# Patient Record
Sex: MALE | Race: WHITE | URBAN - METROPOLITAN AREA
[De-identification: names, ages, dates, MRNs, and addresses within clinical notes are randomized per-mention and may not be internally consistent; named-entity substitution may affect disease eponyms.]

---

## 2022-08-04 PROBLEM — Z12.11 COLON CANCER SCREENING (LOW/AVERAGE RISK): Status: ACTIVE | Noted: 2022-08-04

## 2022-08-15 ENCOUNTER — OFFICE (OUTPATIENT)
Dept: URBAN - METROPOLITAN AREA PATHOLOGY 1 | Facility: PATHOLOGY | Age: 57
End: 2022-08-15

## 2022-08-15 ENCOUNTER — AMBULATORY SURGICAL CENTER (OUTPATIENT)
Dept: URBAN - METROPOLITAN AREA SURGERY 7 | Facility: SURGERY | Age: 57
End: 2022-08-15
Payer: COMMERCIAL

## 2022-08-15 VITALS
RESPIRATION RATE: 25 BRPM | HEIGHT: 73 IN | SYSTOLIC BLOOD PRESSURE: 115 MMHG | HEART RATE: 80 BPM | RESPIRATION RATE: 22 BRPM | HEART RATE: 71 BPM | DIASTOLIC BLOOD PRESSURE: 87 MMHG | HEART RATE: 74 BPM | SYSTOLIC BLOOD PRESSURE: 103 MMHG | SYSTOLIC BLOOD PRESSURE: 120 MMHG | RESPIRATION RATE: 24 BRPM | DIASTOLIC BLOOD PRESSURE: 91 MMHG | OXYGEN SATURATION: 90 % | SYSTOLIC BLOOD PRESSURE: 120 MMHG | RESPIRATION RATE: 22 BRPM | HEART RATE: 67 BPM | HEART RATE: 70 BPM | DIASTOLIC BLOOD PRESSURE: 76 MMHG | OXYGEN SATURATION: 98 % | DIASTOLIC BLOOD PRESSURE: 87 MMHG | DIASTOLIC BLOOD PRESSURE: 71 MMHG | DIASTOLIC BLOOD PRESSURE: 85 MMHG | RESPIRATION RATE: 21 BRPM | HEART RATE: 79 BPM | RESPIRATION RATE: 23 BRPM | HEART RATE: 69 BPM | HEART RATE: 80 BPM | HEART RATE: 74 BPM | DIASTOLIC BLOOD PRESSURE: 70 MMHG | SYSTOLIC BLOOD PRESSURE: 127 MMHG | HEART RATE: 70 BPM | DIASTOLIC BLOOD PRESSURE: 72 MMHG | HEART RATE: 73 BPM | WEIGHT: 210 LBS | HEART RATE: 71 BPM | DIASTOLIC BLOOD PRESSURE: 76 MMHG | OXYGEN SATURATION: 97 % | SYSTOLIC BLOOD PRESSURE: 113 MMHG | DIASTOLIC BLOOD PRESSURE: 71 MMHG | RESPIRATION RATE: 21 BRPM | DIASTOLIC BLOOD PRESSURE: 66 MMHG | HEART RATE: 73 BPM | DIASTOLIC BLOOD PRESSURE: 91 MMHG | DIASTOLIC BLOOD PRESSURE: 85 MMHG | DIASTOLIC BLOOD PRESSURE: 66 MMHG | SYSTOLIC BLOOD PRESSURE: 115 MMHG | SYSTOLIC BLOOD PRESSURE: 113 MMHG | RESPIRATION RATE: 23 BRPM | SYSTOLIC BLOOD PRESSURE: 137 MMHG | OXYGEN SATURATION: 97 % | RESPIRATION RATE: 16 BRPM | HEART RATE: 69 BPM | SYSTOLIC BLOOD PRESSURE: 129 MMHG | SYSTOLIC BLOOD PRESSURE: 129 MMHG | SYSTOLIC BLOOD PRESSURE: 112 MMHG | DIASTOLIC BLOOD PRESSURE: 70 MMHG | HEIGHT: 73 IN | OXYGEN SATURATION: 96 % | SYSTOLIC BLOOD PRESSURE: 112 MMHG | HEART RATE: 79 BPM | RESPIRATION RATE: 24 BRPM | HEART RATE: 65 BPM | RESPIRATION RATE: 16 BRPM | HEART RATE: 67 BPM | RESPIRATION RATE: 25 BRPM | OXYGEN SATURATION: 98 % | SYSTOLIC BLOOD PRESSURE: 137 MMHG | SYSTOLIC BLOOD PRESSURE: 103 MMHG | OXYGEN SATURATION: 90 % | HEART RATE: 65 BPM | SYSTOLIC BLOOD PRESSURE: 127 MMHG | DIASTOLIC BLOOD PRESSURE: 72 MMHG | OXYGEN SATURATION: 96 % | WEIGHT: 210 LBS

## 2022-08-15 DIAGNOSIS — K64.1 SECOND DEGREE HEMORRHOIDS: ICD-10-CM

## 2022-08-15 DIAGNOSIS — Z12.11 ENCOUNTER FOR SCREENING FOR MALIGNANT NEOPLASM OF COLON: ICD-10-CM

## 2022-08-15 DIAGNOSIS — D12.5 BENIGN NEOPLASM OF SIGMOID COLON: ICD-10-CM

## 2022-08-15 DIAGNOSIS — D12.3 BENIGN NEOPLASM OF TRANSVERSE COLON: ICD-10-CM

## 2022-08-15 DIAGNOSIS — K62.1 RECTAL POLYP: ICD-10-CM

## 2022-08-15 DIAGNOSIS — K63.89 OTHER SPECIFIED DISEASES OF INTESTINE: ICD-10-CM

## 2022-08-15 PROCEDURE — 45385 COLONOSCOPY W/LESION REMOVAL: CPT | Mod: 33 | Performed by: INTERNAL MEDICINE

## 2022-08-15 PROCEDURE — 88305 TISSUE EXAM BY PATHOLOGIST: CPT | Performed by: PATHOLOGY

## 2022-08-22 LAB
PDF: PDF REPORT: (no result)
PDF: PDF REPORT: (no result)

## 2022-09-26 ENCOUNTER — OFFICE (OUTPATIENT)
Dept: URBAN - METROPOLITAN AREA CLINIC 17 | Facility: CLINIC | Age: 57
End: 2022-09-26

## 2022-09-26 VITALS
SYSTOLIC BLOOD PRESSURE: 130 MMHG | DIASTOLIC BLOOD PRESSURE: 68 MMHG | HEART RATE: 73 BPM | OXYGEN SATURATION: 96 % | WEIGHT: 213 LBS | HEIGHT: 73 IN

## 2022-09-26 DIAGNOSIS — K59.00 CONSTIPATION, UNSPECIFIED: ICD-10-CM

## 2022-09-26 DIAGNOSIS — K63.5 POLYP OF COLON: ICD-10-CM

## 2022-09-26 DIAGNOSIS — K62.5 HEMORRHAGE OF ANUS AND RECTUM: ICD-10-CM

## 2022-09-26 PROCEDURE — 99213 OFFICE O/P EST LOW 20 MIN: CPT | Performed by: PHYSICIAN ASSISTANT

## 2024-03-12 ENCOUNTER — APPOINTMENT (OUTPATIENT)
Age: 59
End: 2024-03-12
Payer: COMMERCIAL

## 2024-03-12 ENCOUNTER — HOSPITAL ENCOUNTER (OUTPATIENT)
Age: 59
Setting detail: OBSERVATION
Discharge: HOME | End: 2024-03-13
Attending: EMERGENCY MEDICINE | Admitting: HOSPITALIST
Payer: COMMERCIAL

## 2024-03-12 DIAGNOSIS — Z82.49 FAMILY HISTORY OF CORONARY ARTERY DISEASE: ICD-10-CM

## 2024-03-12 DIAGNOSIS — I20.9 ANGINA PECTORIS (HCC): Primary | ICD-10-CM

## 2024-03-12 DIAGNOSIS — F17.210 CIGARETTE SMOKER: ICD-10-CM

## 2024-03-12 PROBLEM — R07.9 CHEST PAIN: Status: ACTIVE | Noted: 2024-03-12

## 2024-03-12 LAB
ALBUMIN SERPL-MCNC: 4.4 G/DL (ref 3.4–5)
ALBUMIN/GLOB SERPL: 1.9 {RATIO}
ALP SERPL-CCNC: 67 U/L (ref 40–129)
ALT SERPL-CCNC: 20 U/L (ref 10–40)
ANION GAP SERPL CALCULATED.3IONS-SCNC: 11 MMOL/L (ref 3–16)
AST SERPL-CCNC: 21 U/L (ref 15–37)
BASOPHILS # BLD: 0.06 K/UL (ref 0–0.2)
BASOPHILS NFR BLD: 1 %
BILIRUB SERPL-MCNC: 0.5 MG/DL (ref 0–1)
BNP SERPL-MCNC: 42 PG/ML (ref 0–124)
BUN SERPL-MCNC: 16 MG/DL (ref 7–20)
CALCIUM SERPL-MCNC: 9.3 MG/DL (ref 8.3–10.6)
CHLORIDE SERPL-SCNC: 105 MMOL/L (ref 99–110)
CO2 SERPL-SCNC: 23 MMOL/L (ref 21–32)
CREAT SERPL-MCNC: 0.9 MG/DL (ref 0.7–1.8)
D DIMER PPP FEU-MCNC: <0.27 UG/ML FEU (ref 0–0.6)
EKG ATRIAL RATE: 66 BPM
EKG DIAGNOSIS: NORMAL
EKG P AXIS: 40 DEGREES
EKG P-R INTERVAL: 174 MS
EKG Q-T INTERVAL: 404 MS
EKG QRS DURATION: 94 MS
EKG QTC CALCULATION (BAZETT): 423 MS
EKG R AXIS: 46 DEGREES
EKG T AXIS: 7 DEGREES
EKG VENTRICULAR RATE: 66 BPM
EOSINOPHIL # BLD: 0.06 K/UL (ref 0–0.6)
EOSINOPHILS RELATIVE PERCENT: 1 %
ERYTHROCYTE [DISTWIDTH] IN BLOOD BY AUTOMATED COUNT: 12.6 % (ref 12.4–15.4)
GFR SERPL CREATININE-BSD FRML MDRD: >60 ML/MIN/1.73M2
GLUCOSE SERPL-MCNC: 97 MG/DL (ref 70–99)
HCT VFR BLD AUTO: 44.4 % (ref 40.5–52.5)
HGB BLD-MCNC: 14.8 G/DL (ref 13.5–17.5)
IMM GRANULOCYTES # BLD AUTO: 0.01 K/UL (ref 0–0.5)
IMM GRANULOCYTES NFR BLD: 0 %
INR PPP: 1 (ref 0.8–1.2)
LIPASE SERPL-CCNC: 53 U/L (ref 13–60)
LYMPHOCYTES NFR BLD: 2.85 K/UL (ref 1–5.1)
LYMPHOCYTES RELATIVE PERCENT: 40 %
MCH RBC QN AUTO: 30.8 PG (ref 26–34)
MCHC RBC AUTO-ENTMCNC: 33.3 G/DL (ref 31–36)
MCV RBC AUTO: 92.5 FL (ref 80–100)
MONOCYTES NFR BLD: 0.49 K/UL (ref 0–1.3)
MONOCYTES NFR BLD: 7 %
NEUTROPHILS NFR BLD: 52 %
NEUTS SEG NFR BLD: 3.71 K/UL (ref 1.7–7.7)
PLATELET # BLD AUTO: 282 K/UL (ref 135–450)
PMV BLD AUTO: 10.5 FL (ref 9.4–12.4)
POTASSIUM SERPL-SCNC: 4.2 MMOL/L (ref 3.5–5.1)
PROT SERPL-MCNC: 6.7 G/DL (ref 6.4–8.2)
PROTHROMBIN TIME: 12.4 SEC (ref 11.5–14.8)
RBC # BLD AUTO: 4.8 M/UL (ref 4.2–5.9)
SODIUM SERPL-SCNC: 139 MMOL/L (ref 136–145)
TROPONIN I SERPL HS-MCNC: 11 NG/L (ref 0–22)
TROPONIN I SERPL HS-MCNC: 7 NG/L (ref 0–22)
TROPONIN I SERPL HS-MCNC: <6 NG/L (ref 0–22)
TROPONIN I SERPL HS-MCNC: <6 NG/L (ref 0–22)
WBC OTHER # BLD: 7.2 K/UL (ref 4–11)

## 2024-03-12 PROCEDURE — 71045 X-RAY EXAM CHEST 1 VIEW: CPT

## 2024-03-12 PROCEDURE — G0378 HOSPITAL OBSERVATION PER HR: HCPCS

## 2024-03-12 PROCEDURE — 85025 COMPLETE CBC W/AUTO DIFF WBC: CPT

## 2024-03-12 PROCEDURE — 83880 ASSAY OF NATRIURETIC PEPTIDE: CPT

## 2024-03-12 PROCEDURE — 2580000003 HC RX 258: Performed by: HOSPITALIST

## 2024-03-12 PROCEDURE — 6370000000 HC RX 637 (ALT 250 FOR IP): Performed by: EMERGENCY MEDICINE

## 2024-03-12 PROCEDURE — 93005 ELECTROCARDIOGRAM TRACING: CPT | Performed by: EMERGENCY MEDICINE

## 2024-03-12 PROCEDURE — 85379 FIBRIN DEGRADATION QUANT: CPT

## 2024-03-12 PROCEDURE — 85610 PROTHROMBIN TIME: CPT

## 2024-03-12 PROCEDURE — 6360000002 HC RX W HCPCS: Performed by: HOSPITALIST

## 2024-03-12 PROCEDURE — 36415 COLL VENOUS BLD VENIPUNCTURE: CPT

## 2024-03-12 PROCEDURE — 6370000000 HC RX 637 (ALT 250 FOR IP): Performed by: INTERNAL MEDICINE

## 2024-03-12 PROCEDURE — 6370000000 HC RX 637 (ALT 250 FOR IP): Performed by: HOSPITALIST

## 2024-03-12 PROCEDURE — 84484 ASSAY OF TROPONIN QUANT: CPT

## 2024-03-12 PROCEDURE — 99222 1ST HOSP IP/OBS MODERATE 55: CPT | Performed by: INTERNAL MEDICINE

## 2024-03-12 PROCEDURE — 93005 ELECTROCARDIOGRAM TRACING: CPT

## 2024-03-12 PROCEDURE — 99285 EMERGENCY DEPT VISIT HI MDM: CPT

## 2024-03-12 PROCEDURE — 83690 ASSAY OF LIPASE: CPT

## 2024-03-12 PROCEDURE — C8929 TTE W OR WO FOL WCON,DOPPLER: HCPCS

## 2024-03-12 PROCEDURE — 80053 COMPREHEN METABOLIC PANEL: CPT

## 2024-03-12 PROCEDURE — 96372 THER/PROPH/DIAG INJ SC/IM: CPT

## 2024-03-12 RX ORDER — METOPROLOL TARTRATE 50 MG/1
50 TABLET, FILM COATED ORAL 2 TIMES DAILY
Status: DISCONTINUED | OUTPATIENT
Start: 2024-03-12 | End: 2024-03-13 | Stop reason: HOSPADM

## 2024-03-12 RX ORDER — POTASSIUM CHLORIDE 20 MEQ/1
40 TABLET, EXTENDED RELEASE ORAL PRN
Status: DISCONTINUED | OUTPATIENT
Start: 2024-03-12 | End: 2024-03-13 | Stop reason: HOSPADM

## 2024-03-12 RX ORDER — ASPIRIN 81 MG/1
324 TABLET, CHEWABLE ORAL ONCE
Status: COMPLETED | OUTPATIENT
Start: 2024-03-12 | End: 2024-03-12

## 2024-03-12 RX ORDER — POLYETHYLENE GLYCOL 3350 17 G/17G
17 POWDER, FOR SOLUTION ORAL DAILY PRN
Status: DISCONTINUED | OUTPATIENT
Start: 2024-03-12 | End: 2024-03-13 | Stop reason: HOSPADM

## 2024-03-12 RX ORDER — ACETAMINOPHEN 650 MG/1
650 SUPPOSITORY RECTAL EVERY 6 HOURS PRN
Status: DISCONTINUED | OUTPATIENT
Start: 2024-03-12 | End: 2024-03-13 | Stop reason: HOSPADM

## 2024-03-12 RX ORDER — SODIUM CHLORIDE 0.9 % (FLUSH) 0.9 %
5-40 SYRINGE (ML) INJECTION EVERY 12 HOURS SCHEDULED
Status: DISCONTINUED | OUTPATIENT
Start: 2024-03-12 | End: 2024-03-13 | Stop reason: HOSPADM

## 2024-03-12 RX ORDER — SODIUM CHLORIDE 0.9 % (FLUSH) 0.9 %
5-40 SYRINGE (ML) INJECTION PRN
Status: DISCONTINUED | OUTPATIENT
Start: 2024-03-12 | End: 2024-03-13 | Stop reason: HOSPADM

## 2024-03-12 RX ORDER — ONDANSETRON 2 MG/ML
4 INJECTION INTRAMUSCULAR; INTRAVENOUS EVERY 6 HOURS PRN
Status: DISCONTINUED | OUTPATIENT
Start: 2024-03-12 | End: 2024-03-13 | Stop reason: HOSPADM

## 2024-03-12 RX ORDER — ONDANSETRON 2 MG/ML
4 INJECTION INTRAMUSCULAR; INTRAVENOUS
Status: DISCONTINUED | OUTPATIENT
Start: 2024-03-12 | End: 2024-03-13 | Stop reason: SDUPTHER

## 2024-03-12 RX ORDER — ACETAMINOPHEN 325 MG/1
650 TABLET ORAL EVERY 6 HOURS PRN
Status: DISCONTINUED | OUTPATIENT
Start: 2024-03-12 | End: 2024-03-13 | Stop reason: HOSPADM

## 2024-03-12 RX ORDER — ATORVASTATIN CALCIUM 40 MG/1
40 TABLET, FILM COATED ORAL NIGHTLY
Status: DISCONTINUED | OUTPATIENT
Start: 2024-03-12 | End: 2024-03-13 | Stop reason: HOSPADM

## 2024-03-12 RX ORDER — SODIUM CHLORIDE 9 MG/ML
INJECTION, SOLUTION INTRAVENOUS PRN
Status: DISCONTINUED | OUTPATIENT
Start: 2024-03-12 | End: 2024-03-13 | Stop reason: HOSPADM

## 2024-03-12 RX ORDER — NITROGLYCERIN 0.4 MG/1
0.4 TABLET SUBLINGUAL EVERY 5 MIN PRN
Status: DISCONTINUED | OUTPATIENT
Start: 2024-03-12 | End: 2024-03-13 | Stop reason: HOSPADM

## 2024-03-12 RX ORDER — METOPROLOL TARTRATE 1 MG/ML
5 INJECTION, SOLUTION INTRAVENOUS EVERY 5 MIN PRN
Status: DISCONTINUED | OUTPATIENT
Start: 2024-03-12 | End: 2024-03-13 | Stop reason: HOSPADM

## 2024-03-12 RX ORDER — ONDANSETRON 4 MG/1
4 TABLET, ORALLY DISINTEGRATING ORAL EVERY 8 HOURS PRN
Status: DISCONTINUED | OUTPATIENT
Start: 2024-03-12 | End: 2024-03-13 | Stop reason: HOSPADM

## 2024-03-12 RX ORDER — POTASSIUM CHLORIDE 7.45 MG/ML
10 INJECTION INTRAVENOUS PRN
Status: DISCONTINUED | OUTPATIENT
Start: 2024-03-12 | End: 2024-03-13 | Stop reason: HOSPADM

## 2024-03-12 RX ORDER — MAGNESIUM SULFATE IN WATER 40 MG/ML
2000 INJECTION, SOLUTION INTRAVENOUS PRN
Status: DISCONTINUED | OUTPATIENT
Start: 2024-03-12 | End: 2024-03-13 | Stop reason: HOSPADM

## 2024-03-12 RX ORDER — NITROGLYCERIN 0.4 MG/1
0.4 TABLET SUBLINGUAL EVERY 5 MIN PRN
Status: DISCONTINUED | OUTPATIENT
Start: 2024-03-12 | End: 2024-03-12 | Stop reason: SDUPTHER

## 2024-03-12 RX ORDER — ASPIRIN 81 MG/1
81 TABLET, CHEWABLE ORAL DAILY
Status: DISCONTINUED | OUTPATIENT
Start: 2024-03-13 | End: 2024-03-13 | Stop reason: HOSPADM

## 2024-03-12 RX ORDER — ENOXAPARIN SODIUM 100 MG/ML
40 INJECTION SUBCUTANEOUS DAILY
Status: DISCONTINUED | OUTPATIENT
Start: 2024-03-12 | End: 2024-03-13 | Stop reason: HOSPADM

## 2024-03-12 RX ADMIN — ATORVASTATIN CALCIUM 40 MG: 40 TABLET, FILM COATED ORAL at 21:07

## 2024-03-12 RX ADMIN — ENOXAPARIN SODIUM 40 MG: 100 INJECTION SUBCUTANEOUS at 15:08

## 2024-03-12 RX ADMIN — NITROGLYCERIN 1 INCH: 20 OINTMENT TOPICAL at 12:39

## 2024-03-12 RX ADMIN — ASPIRIN 81 MG 324 MG: 81 TABLET ORAL at 12:36

## 2024-03-12 RX ADMIN — METOPROLOL TARTRATE 50 MG: 50 TABLET, FILM COATED ORAL at 21:07

## 2024-03-12 RX ADMIN — Medication 10 ML: at 21:13

## 2024-03-12 ASSESSMENT — PAIN - FUNCTIONAL ASSESSMENT: PAIN_FUNCTIONAL_ASSESSMENT: NONE - DENIES PAIN

## 2024-03-12 ASSESSMENT — LIFESTYLE VARIABLES
HOW MANY STANDARD DRINKS CONTAINING ALCOHOL DO YOU HAVE ON A TYPICAL DAY: 5 OR 6
HOW OFTEN DO YOU HAVE A DRINK CONTAINING ALCOHOL: 4 OR MORE TIMES A WEEK

## 2024-03-12 ASSESSMENT — HEART SCORE: ECG: 0

## 2024-03-12 NOTE — H&P
History and Physical    Name:  Ashwin Newsome /Age/Sex: 1965  (58 y.o. male)   MRN & CSN:  [id number] & [id number] Encounter Date/Time: 3/12/51835:30 PM   Location:  [facility]7/3207-01 PCP: Phoebe Love     Admitting Physician:  Mendoza Ventura MD     Assessment:   Ashwin Newsome is a 58 y.o. male with medical history including COPD presented with episodes of chest discomfort, sweating and nausea x 2 in last 3 days.  In ER found to have abnormal EKG with nonspecific ST-T wave changes.  Chest pressure/atypical chest pain r/o ACS  Abnormal EKG: St-T wave change in inferior leads  COPD, no acute exacerbation: On albuterol inhaler as needed  Tobacco smoking: Smokes about 2 packs/day    Plan:   Admission status - observation, Expect length of stay < 2 midnights  Troponins x 3, tele monitoring  Discussed with cardiology team, echocardiogram and CTA for cardiac evaluation.  Advised to quit smoking  Albuterol inhaler as needed, respiratory therapy  Resume home meds as ordered   See orders  Personally reviewed Lab Studies and Imaging results ordered by ER.  Discussed management of the case with ER team who recommended admission under Hospitalist service  EKG 3/12/2024 interpreted personally and results - NSR, nonspecific ST-T wave changes in inferior leads  Drugs that require monitoring for toxicity include   Diet ADULT DIET; Regular; No Added Salt (3-4 gm); No Caffeine   DVT Prophylaxis [] Lovenox, []  Heparin, [] SCDs, [] Ambulation,  [] Eliquis, [] Xarelto, [] Coumadin   Code Status Full Code     Disposition Plan:   Current Living situation: Home  Expected Disposition:  2-3 days  Estimated D/C:  vs [facility]     History from:   Patient and Family    History of Present Illness:   Chief Complaint/Reason for admission: Sweating episodes with chest discomfort    HPI:  Ashwin Newsome is a 58 y.o. male with medical history including COPD presented to emergency room with complaint of 2 separate episodes of chest

## 2024-03-12 NOTE — CONSULTS
estimated     ejection fraction was in the range of 55% to 60%. Wall     motion was normal; there were no regional wall motion     abnormalities.     Assessment/Plan:  Principal Problem:    Chest pain  Troponin neg X3       Plan:   Echo.  Cardiac CTA - metoprolol 50 mg tonight and tomorrow am to get HR <60 bpm, hold parameters placed.       Thank you for allowing us to participate in the care of Ashwin Newsome. Further evaluation will be based upon the patient's clinical course and testing results.    All questions and concerns were addressed to the patient/family. Alternatives to my treatment were discussed. The note was completed using EMR. Every effort was made to ensure accuracy; however, inadvertent computerized transcription errors may be present.    Manan Garnett M.D.

## 2024-03-12 NOTE — PLAN OF CARE
Problem: Discharge Planning  Goal: Discharge to home or other facility with appropriate resources  3/12/2024 1743 by Isabelle Bee RN  Outcome: Progressing  3/12/2024 1743 by Isabelle Bee RN  Outcome: Progressing     Problem: Safety - Adult  Goal: Free from fall injury  3/12/2024 1743 by Isabelle Bee RN  Outcome: Progressing  3/12/2024 1743 by Isabelle Bee RN  Outcome: Progressing     Problem: Chronic Conditions and Co-morbidities  Goal: Patient's chronic conditions and co-morbidity symptoms are monitored and maintained or improved  3/12/2024 1743 by Isabelle Bee RN  Outcome: Progressing  3/12/2024 1743 by Isabelle Bee RN  Outcome: Progressing

## 2024-03-12 NOTE — ED NOTES
Patient oriented to room and ED throughput process.  Safety measures with ED bed locked in lowest position and call light in reach.  Patient educated on all orders, including any medications.  Patient educated on chief complaint/symptoms. Patient encouraged to ask questions regarding care, medications or treatment plan.  Patient aware of how to reach staff with questions/concerns.  
Patient placed on cardiac monitor upon arrival  
evan Bose at bedside obtaining EKG   
(ZOFRAN) injection 4 mg (has no administration in time range)   aspirin chewable tablet 324 mg (324 mg Oral Given 3/12/24 1236)   nitroglycerin (NITRO-BID) 2 % ointment 1 inch (1 inch Topical Given 3/12/24 1239)     Last documented pain medication administration: na  Pertinent or High Risk Medications/Drips: no   If Yes, please provide details:   Blood Product Administration: no  If Yes, please provide details:   Process Protocols/Bundles: N/A    Recommendation  Incomplete STAT orders: N/A  Overdue Medications: N/A  Patient Belongings: given to wife  Additional Comments:   If any further questions, please call Sending RN at [phone]      Admitting Unit Notification  Name of person notified and time: Leticia GAUTAM      Electronically signed by: Electronically signed by Cristiane Quintero RN on 3/12/2024 at 1:21 PM

## 2024-03-12 NOTE — ED PROVIDER NOTES
[facility] - [facility], Kota Rick MD, am the primary clinician of record.    CHIEF COMPLAINT  Chief Complaint   Patient presents with    Dizziness     Pt reports dizziness that began Friday night. Pt report his hear felt \"like it was beating fast\", he was shaky, and sweaty, pt reports he is currently having \"a little bit of dizziness\" and \"sick to his stomach\".       HISTORY OF PRESENT ILLNESS  Ashwin Newsome is a 58 y.o. male  who presents to the ED complaining of onset initially on Friday evening of unprovoked palpitations, shakiness and pallor with diaphoresis, some nausea and some chest pressure transient sensation.  He felt lightheaded but did not pass out.  No focal numbness weakness or tingling.  He has not had a fever.  He does have a history of COPD and is a smoker with a strong family history of CAD.  However he has no personal cardiac history.  He says that he only was able to complete part of a stress test but never had a heart cath.  He has no history of hypertension hyperlipidemia or diabetes that he knows about.  His symptoms were \"okay\" over the weekend however worsened today to the point where his significant other was concerned and brought him to the hospital.    No other complaints, modifying factors or associated symptoms.     I have reviewed the following from the nursing documentation.    Past Medical History:   Diagnosis Date    COPD (chronic obstructive pulmonary disease) (HCC)      History reviewed. No pertinent surgical history.  History reviewed. No pertinent family history.  Social History     Socioeconomic History    Marital status:      Spouse name: Not on file    Number of children: Not on file    Years of education: Not on file    Highest education level: Not on file   Occupational History    Not on file   Tobacco Use    Smoking status: Every Day     Current packs/day: 2.00     Types: Cigarettes    Smokeless tobacco: Never   Substance

## 2024-03-13 ENCOUNTER — APPOINTMENT (OUTPATIENT)
Age: 59
End: 2024-03-13
Payer: COMMERCIAL

## 2024-03-13 VITALS
TEMPERATURE: 98 F | OXYGEN SATURATION: 92 % | BODY MASS INDEX: 28.15 KG/M2 | SYSTOLIC BLOOD PRESSURE: 109 MMHG | DIASTOLIC BLOOD PRESSURE: 93 MMHG | HEIGHT: 73 IN | WEIGHT: 212.4 LBS | RESPIRATION RATE: 17 BRPM | HEART RATE: 61 BPM

## 2024-03-13 LAB
CHOLEST SERPL-MCNC: 181 MG/DL (ref 0–199)
EKG ATRIAL RATE: 58 BPM
EKG DIAGNOSIS: NORMAL
EKG P AXIS: 69 DEGREES
EKG P-R INTERVAL: 182 MS
EKG Q-T INTERVAL: 426 MS
EKG QRS DURATION: 94 MS
EKG QTC CALCULATION (BAZETT): 418 MS
EKG R AXIS: 67 DEGREES
EKG T AXIS: 62 DEGREES
EKG VENTRICULAR RATE: 58 BPM
ERYTHROCYTE [DISTWIDTH] IN BLOOD BY AUTOMATED COUNT: 12.7 % (ref 12.4–15.4)
HCT VFR BLD AUTO: 41.9 % (ref 40.5–52.5)
HDLC SERPL-MCNC: 44 MG/DL (ref 40–60)
HGB BLD-MCNC: 14 G/DL (ref 13.5–17.5)
LDLC SERPL CALC-MCNC: 115 MG/DL (ref 0–99)
MCH RBC QN AUTO: 31.1 PG (ref 26–34)
MCHC RBC AUTO-ENTMCNC: 33.4 G/DL (ref 31–36)
MCV RBC AUTO: 93.1 FL (ref 80–100)
PLATELET # BLD AUTO: 249 K/UL (ref 135–450)
PMV BLD AUTO: 10.6 FL (ref 9.4–12.4)
RBC # BLD AUTO: 4.5 M/UL (ref 4.2–5.9)
TRIGL SERPL-MCNC: 110 MG/DL (ref 0–149)
VLDLC SERPL CALC-MCNC: 22 MG/DL
WBC OTHER # BLD: 8.3 K/UL (ref 4–11)

## 2024-03-13 PROCEDURE — 80061 LIPID PANEL: CPT

## 2024-03-13 PROCEDURE — 6360000004 HC RX CONTRAST MEDICATION: Performed by: INTERNAL MEDICINE

## 2024-03-13 PROCEDURE — 6370000000 HC RX 637 (ALT 250 FOR IP): Performed by: RADIOLOGY

## 2024-03-13 PROCEDURE — 2580000003 HC RX 258: Performed by: HOSPITALIST

## 2024-03-13 PROCEDURE — G0378 HOSPITAL OBSERVATION PER HR: HCPCS

## 2024-03-13 PROCEDURE — 6370000000 HC RX 637 (ALT 250 FOR IP): Performed by: HOSPITALIST

## 2024-03-13 PROCEDURE — 75574 CT ANGIO HRT W/3D IMAGE: CPT

## 2024-03-13 PROCEDURE — 99232 SBSQ HOSP IP/OBS MODERATE 35: CPT | Performed by: INTERNAL MEDICINE

## 2024-03-13 PROCEDURE — 85027 COMPLETE CBC AUTOMATED: CPT

## 2024-03-13 PROCEDURE — 36415 COLL VENOUS BLD VENIPUNCTURE: CPT

## 2024-03-13 RX ORDER — ALBUTEROL SULFATE 90 UG/1
2 AEROSOL, METERED RESPIRATORY (INHALATION) 4 TIMES DAILY PRN
Qty: 18 G | Refills: 0
Start: 2024-03-13

## 2024-03-13 RX ORDER — NITROGLYCERIN 0.4 MG/1
0.4 TABLET SUBLINGUAL EVERY 5 MIN PRN
Status: COMPLETED | OUTPATIENT
Start: 2024-03-13 | End: 2024-03-13

## 2024-03-13 RX ORDER — AZITHROMYCIN 500 MG/1
500 TABLET, FILM COATED ORAL DAILY
Qty: 5 TABLET | Refills: 0 | Status: SHIPPED | OUTPATIENT
Start: 2024-03-13 | End: 2024-03-18

## 2024-03-13 RX ADMIN — NITROGLYCERIN 0.4 MG: 0.4 TABLET SUBLINGUAL at 09:54

## 2024-03-13 RX ADMIN — ASPIRIN 81 MG 81 MG: 81 TABLET ORAL at 10:09

## 2024-03-13 RX ADMIN — IOPAMIDOL 100 ML: 755 INJECTION, SOLUTION INTRAVENOUS at 09:39

## 2024-03-13 RX ADMIN — Medication 10 ML: at 10:11

## 2024-03-13 ASSESSMENT — PAIN DESCRIPTION - LOCATION: LOCATION: CHEST

## 2024-03-13 ASSESSMENT — PAIN - FUNCTIONAL ASSESSMENT: PAIN_FUNCTIONAL_ASSESSMENT: ACTIVITIES ARE NOT PREVENTED

## 2024-03-13 ASSESSMENT — PAIN DESCRIPTION - ONSET: ONSET: SUDDEN

## 2024-03-13 ASSESSMENT — PAIN DESCRIPTION - FREQUENCY: FREQUENCY: INTERMITTENT

## 2024-03-13 ASSESSMENT — PAIN DESCRIPTION - ORIENTATION: ORIENTATION: MID

## 2024-03-13 ASSESSMENT — PAIN DESCRIPTION - DESCRIPTORS: DESCRIPTORS: ACHING

## 2024-03-13 ASSESSMENT — PAIN SCALES - GENERAL: PAINLEVEL_OUTOF10: 6

## 2024-03-13 NOTE — CARE COORDINATION
Discharge planning:    Plan is home in care of family. Wife can transport pt home at discharge. No d/c needs at this time. Cm to continue to follow for additional needs.       KANIKA RodriguezN, RN  RN Case Manager   
discharge:      Financial    Payor: BCBS / Plan: BCBS - [place] PPO / Product Type: *No Product type* /     Does insurance require precert for SNF: Yes    Potential assistance Purchasing Medications: No  Meds-to-Beds request:        [id number] - [place], [place] - [address] - P [phone] - F [phone]  [address] [facility] [address]  [facility] [address]  Phone: [phone] Fax: [phone]      Notes:    Factors facilitating achievement of predicted outcomes: Family support, Motivated, Cooperative, and Pleasant    Barriers to discharge: Decreased endurance    Additional Case Management Notes: Reviewed chart. Met with pt and wife at bedside. Explained cm role.  Pt is from home. Plan is home in care of family. Wife can transport pt home at discharge. No d/c needs at this time. Cm to continue to follow.    The Plan for Transition of Care is related to the following treatment goals of Angina pectoris (HCC) [I20.9]  Chest pain [R07.9]    IF APPLICABLE: The Patient and/or patient representative Ashwin and his family were provided with a choice of provider and agrees with the discharge plan. Freedom of choice list with basic dialogue that supports the patient's individualized plan of care/goals and shares the quality data associated with the providers was provided to:     Patient Representative Name:       The Patient and/or Patient Representative Agree with the Discharge Plan?      SHAYNE Grewal  Case Management Department  Ph: [phone] Fax: [phone]

## 2024-03-13 NOTE — DISCHARGE INSTRUCTIONS
If you have any problems after going home please call your family doctor or go to the nearest emergency room.  Call PCP or go to nearest ER if you develop any of following symptoms or any worsening symptoms including Fever, chills, chest pain, cough, blood in sputum, shortness of breath, wheezing, nausea, vomiting, abdominal pain, diarrhea, Headach, Dizziness or any tingling or numbness in extremities.   Recommended medication compliance and regular follow up as outpt.

## 2024-03-13 NOTE — PLAN OF CARE
Problem: Discharge Planning  Goal: Discharge to home or other facility with appropriate resources  3/13/2024 1251 by Isabelle Bee RN  Outcome: Progressing  3/13/2024 0356 by Monalisa Fuller RN  Outcome: Progressing     Problem: Safety - Adult  Goal: Free from fall injury  3/13/2024 1251 by Isabelle Bee RN  Outcome: Progressing  3/13/2024 0356 by Monalisa Fuller RN  Outcome: Progressing     Problem: Chronic Conditions and Co-morbidities  Goal: Patient's chronic conditions and co-morbidity symptoms are monitored and maintained or improved  Outcome: Progressing

## 2024-03-13 NOTE — PROGRESS NOTES
Pt was educated that bed/chair alarm is to alert staff to ensure safety of the patient. Pt refuses bed/chair alarm and was informed of risk for fall, injury, and possible death. Pt verbalized understanding and continues to refuse bed/chair alarm.     Pt also informed NPO after midnight.   
Discharge instructions reviewed with patient and spouse. Verbalized understanding. IV dc'd without complications. Tele box returned to nurse's station. Patient to be taken to front entrance independently, pt. Refused wheelchair after fall risk education.    
Patient admitted to room 3207  from ED. Patient oriented to room, call light, bed rails, phone, lights and bathroom. Patient instructed about the schedule of the day including: vital sign frequency, lab draws, possible tests, frequency of MD and staff rounds, daily weights, I &O's and prescribed diet. Telemetry box in place, patient aware of placement and reason. Bed locked, in lowest position, side rails up 2/4, call light within reach.        Recliner Assessment  Patient is able to demonstrate the ability to move from a reclining position to an upright position within the recliner.       4 Eyes Skin Assessment     NAME:  Ashwin Newsome  YOB: 1965  MEDICAL RECORD NUMBER:  [id number]    The patient is being assessed for  Admission    I agree that at least one RN has performed a thorough Head to Toe Skin Assessment on the patient. ALL assessment sites listed below have been assessed.      Areas assessed by both nurses:    Head, Face, Ears, Shoulders, Back, Chest, Arms, Elbows, Hands, Sacrum. Buttock, Coccyx, Ischium, Legs. Feet and Heels, and Under Medical Devices         Does the Patient have a Wound? No noted wound(s)       Noel Prevention initiated by RN: No  Wound Care Orders initiated by RN: No    Pressure Injury (Stage 3,4, Unstageable, DTI, NWPT, and Complex wounds) if present, place Wound referral order by RN under : No    New Ostomies, if present place, Ostomy referral order under : No     Nurse 1 eSignature: Electronically signed by INOCENCIA BOLAÑOS RN on 3/12/24 at 1:59 PM EDT    **SHARE this note so that the co-signing nurse can place an eSignature**    Nurse 2 eSignature: Electronically signed by Isabelle Bee RN on 3/12/24 at 6:12 PM EDT   
Patient vitals prior to Cardiac /75 HR 52 patient given 0.4mg of SL nitro @ 0954. Patient tolerated well. CT scan completed per technologist   
Pt complain of chest pain to  who called RN. MD notified that vitals stable but heart rate was 56. MD aware of metoprolol was given for cta today. MD notified and said to continue to monitor. Since, pt further denies pain throughout rest of shift.   
Pt was educated that bed/chair alarm is to alert staff to ensure safety of the patient. Pt refuses bed/chair alarm and was informed of risk for fall, injury, and possible death. Pt verbalized understanding continues to refuse bed/chair alarm.     
discussed. The note was completed using EMR. Every effort was made to ensure accuracy; however, inadvertent computerized transcription errors may be present.  Breezy Sotelo MD, MD 3/13/2024 10:09 AM

## 2024-03-13 NOTE — DISCHARGE SUMMARY
Discharge Summary    Name:  Ashwin Newsome /Age/Sex: 1965 (58 y.o. male)   Admit Date: 3/12/2024  Discharge Date: 3/13/24    MRN & CSN:  [id number] & [id number] Encounter Date and Time 3/13/24    Attending:  Mendoza Ventura MD Discharging Provider: Mendoza Ventura MD     Hospital Course:   REASON FOR ADMISSION/CHIEF COMPLAINT:   Chest pain    PRINCIPAL DIAGNOSIS* AND HOSPITAL PROBLEM LIST:  Angina pectoris (HCC) [I20.9]  Chest pain [R07.9]    CONSULTS DURING THE ADMISSION:  IP CONSULT TO CARDIOLOGY    BRIEF HPI & SUMMARY OF HOSPITAL COURSE:   Ashwin Newsome is a 58 y.o. male with medical history including COPD presented with episodes of chest discomfort, sweating and nausea x 2 in last 3 days.  In ER found to have abnormal EKG with nonspecific ST-T wave changes.  Chest pressure/atypical chest pain, unclear etiology possible due to #3: Acute coronary syndrome was ruled out.  TTE normal.  CCTA with calcium score of 0.  No concern of coronary artery disease per cardiology team.  Abnormal EKG: St-T wave change in inferior leads  LLL consolidation in CT chest, unclear etio, possible walking PNA: start on po azithromycin for 5 days.  Discussed with patient and family at bedside, recommended to follow-up with the PCP and repeat chest x-ray in 4 to 6 weeks to ensure complete resolution.  If the consolation finding unresolved may need further workup including lung biopsy to rule out possibility of lung cancer.  Patient was advised to quit smoking.  COPD, no acute exacerbation: On albuterol inhaler as needed  Tobacco smoking: Smokes about 2 packs/day.  Patient was advised to quit smoking.  Patient verbalized understanding.  On day of discharge patient was feeling much better, was eating, voiding, and passing stool without difficulty, felt had reached maximum benefit of this hospitalization, so was discharged in stable condition.    PENDING TESTS and PERTINENT FINDINGS REQUIRING FOLLOW UP:    As above.    PRIOR TO

## 2024-03-13 NOTE — PLAN OF CARE
Problem: Discharge Planning  Goal: Discharge to home or other facility with appropriate resources  3/13/2024 0356 by Monalisa Fuller, RN  Outcome: Progressing    Problem: Safety - Adult  Goal: Free from fall injury  3/13/2024 0356 by Monalisa Fuller, RN  Outcome: Progressing

## 2024-03-18 ENCOUNTER — TELEPHONE (OUTPATIENT)
Dept: CARDIOLOGY CLINIC | Age: 59
End: 2024-03-18

## 2024-03-18 NOTE — TELEPHONE ENCOUNTER
Following hospital consult from [facility] , the Pt still feels a bit nervous and would like to follow up with  office to see what the correct next steps are. Pt's wife states there was mention of a monitor and liked to be called back to see what the next steps should be    Please call wife to advise.  Thank you.

## 2024-03-20 DIAGNOSIS — R00.2 PALPITATIONS: Primary | ICD-10-CM

## 2024-03-21 ENCOUNTER — ANCILLARY PROCEDURE (OUTPATIENT)
Age: 59
End: 2024-03-21

## 2024-03-21 NOTE — PROGRESS NOTES
[facility] requested for pt. Device was registered and placed. Tutorial given, pt verbalized understanding.    30 days  S/N: [id number]  Start Date: 03/21/24

## 2024-03-21 NOTE — TELEPHONE ENCOUNTER
Spoke with pt relayed  message below. Pt verbalized understanding.    i schedule [facility] pt at  for 30 day monitor at 1pm today. Pt v/u.

## 2024-03-22 LAB
EKG ATRIAL RATE: 61 BPM
EKG DIAGNOSIS: NORMAL
EKG P AXIS: 64 DEGREES
EKG P-R INTERVAL: 188 MS
EKG Q-T INTERVAL: 434 MS
EKG QRS DURATION: 94 MS
EKG QTC CALCULATION (BAZETT): 436 MS
EKG R AXIS: 72 DEGREES
EKG T AXIS: 65 DEGREES
EKG VENTRICULAR RATE: 61 BPM